# Patient Record
Sex: FEMALE | Race: WHITE | NOT HISPANIC OR LATINO | Employment: OTHER | URBAN - METROPOLITAN AREA
[De-identification: names, ages, dates, MRNs, and addresses within clinical notes are randomized per-mention and may not be internally consistent; named-entity substitution may affect disease eponyms.]

---

## 2017-08-21 ENCOUNTER — ALLSCRIPTS OFFICE VISIT (OUTPATIENT)
Dept: OTHER | Facility: OTHER | Age: 77
End: 2017-08-21

## 2018-01-13 VITALS
SYSTOLIC BLOOD PRESSURE: 140 MMHG | DIASTOLIC BLOOD PRESSURE: 72 MMHG | RESPIRATION RATE: 14 BRPM | HEART RATE: 88 BPM | HEIGHT: 60 IN | BODY MASS INDEX: 30.04 KG/M2 | TEMPERATURE: 97.8 F | WEIGHT: 153 LBS

## 2018-01-13 NOTE — PROGRESS NOTES
Assessment    1  Medicare annual wellness visit, subsequent (V70 0) (Z00 00)    Discussion/Summary  Impression: Subsequent Annual Wellness Visit, with preventive exam as well as age and risk appropriate counseling completed  Cardiovascular screening and counseling: the risks and benefits of screening were discussed, counseling was given on maintaining a healthy diet, counseling was given on maintaining a healthy weight, counseling was given on ways to improve cholesterol and counseling was given on ways to improve exercise tolerance  Diabetes screening and counseling: the risks and benefits of screening were discussed, counseling was given on maintaining a healthy diet, counseling was given on maintaining a healthy weight and counseling was given on ways to improve physical activity  Colorectal cancer screening and counseling: the risks and benefits of screening were discussed  Breast cancer screening and counseling: the risks and benefits of screening were discussed  Cervical cancer screening and counseling: screening not indicated  Osteoporosis screening and counseling: the risks and benefits of screening were discussed, counseling was given on obtaining adequate amounts of calcium and vitamin D on a daily basis and counseling was given on the importance of regular weightbearing exercise  Abdominal aortic aneurysm screening and counseling: screening not indicated  Glaucoma screening and counseling: screening is current  HIV screening and counseling: screening not indicated   Immunizations: influenza vaccine is up to date this year, the lifetime pneumococcal vaccine has been completed, hepatitis B vaccination series is not indicated at this time due to the patient's low risk of ean the disease, the risks and benefits of the Zostavax vaccine were discussed with the patient, the risks and benefits of the Td vaccine were discussed with the patient and the risks and benefits of the Tdap vaccine were discussed with the patient  Advance Directive Planning: paperwork and instructions were given to the patient, she was encouraged to follow-up with me to discuss her questions and/or decisions  Patient Discussion: plan discussed with the patient, follow-up as needed  Chief Complaint  pt here for AWV tc/cma      History of Present Illness  The patient is being seen for the subsequent annual wellness visit  Medicare Screening and Risk Factors   Hospitalizations: no previous hospitalizations  Once per lifetime medicare screening tests: ECG has not been done and AAA screening US has not yet been done  Medicare Screening Tests Risk Questions   Abdominal aortic aneurysm risk assessment: none indicated  Osteoporosis risk assessment: , female gender, over 48years of age, past medical history of fracture(s) and family history of fracture(s)  HIV risk assessment: none indicated  Drug and Alcohol Use: The patient is a former cigarette smoker and quit 40 yrs  The patient reports occasional alcohol use  Alcohol concern:   The patient has no concerns about alcohol abuse  She has never used illicit drugs  Diet and Physical Activity: Current diet includes well balanced meals, 1 servings of fruit per day, 1 servings of meat per day, 1 cups of coffee per day and 1 cups of tea per day  She exercises infrequently  Exercise: walking  Mood Disorder and Cognitive Impairment Screening: PHQ-9 Depression Scale   Over the past 2 weeks, how often have you been bothered by the following problems? 1 ) Little interest or pleasure in doing things? Not at all    2 ) Feeling down, depressed or hopeless? Not at all    3 ) Trouble falling asleep or sleeping too much? Not at all    4 ) Feeling tired or having little energy? Not at all    5 ) Poor appetite or overeating? Not at all    6 ) Feeling bad about yourself, or that you are a failure, or have let yourself or your family down?  Not at all    7 ) Trouble concentrating on things, such as reading a newspaper or watching television? Not at all    8 ) Moving or speaking so slowly that other people could have noticed, or the opposite, moving or speaking faster than usual? Not at all  TOTAL SCORE: 0    How difficult have these problems made it for you to do your work, take care of things at home, or get along with people? Not at all  She denies feeling down, depressed, or hopeless over the past two weeks  She denies feeling little interest or pleasure in doing things over the past two weeks  Cognitive impairment screening: denies difficulty learning/retaining new information, denies difficulty handling complex tasks, denies difficulty with reasoning, denies difficulty with spatial ability and orientation, denies difficulty with language and denies difficulty with behavior  Functional Ability/Level of Safety: Hearing is normal bilaterally, normal in the right ear, normal in the left ear and a hearing aid is not used  She denies hearing difficulties  The patient is currently able to do activities of daily living without limitations, able to do instrumental activities of daily living without limitations, able to participate in social activities without limitations and able to drive without limitations  Activities of daily living details: does not need help using the phone, no transportation help needed, does not need help shopping, no meal preparation help needed, does not need help doing housework, does not need help doing laundry, does not need help managing medications and does not need help managing money  Fall risk factors:   The patient fell 1 with injury times in the past 12 months , no polypharmacy, no alcohol use, no mobility impairment, no antidepressant use, no deconditioning, no postural hypotension, no sedative use, no visual impairment, no urinary incontinence, no antihypertensive use, no cognitive impairment, up and go test was normal and no previous fall  Home safety risk factors:  no grab bars in the bathroom, but no unfamiliar surroundings, no loose rugs, no poor household lighting, no uneven floors, no household clutter and handrails on the stairs  Advance Directives: Advance directives: Five Wishes Pamphlet given to review/complete, but no living will, no durable power of  for health care directives and no advance directives  end of life decisions were reviewed with the patient  Co-Managers and Medical Equipment/Suppliers: See Patient Care Team      Active Problems    1  Acute bronchitis (466 0) (J20 9)   2  Asthma (493 90) (J45 909)   3  Back spasm (724 8) (M62 830)   4  Cellulitis (682 9) (L03 90)   5  Cough (786 2) (R05)   6  Encounter for screening colonoscopy (V76 51) (Z12 11)   7  Encounter for screening mammogram for malignant neoplasm of breast (V76 12)   (Z12 31)   8  Generalized osteoarthritis (715 00) (M15 9)   9  Initial Medicare annual wellness visit (V70 0) (Z00 00)   10  Mild persistent asthma (493 90) (J45 30)   11  Need for influenza vaccination (V04 81) (Z23)   12  Need for pneumococcal vaccine (V03 82) (Z23)   13  Osteoarthritis of right knee (715 96) (M17 9)   14  Osteoporosis (733 00) (M81 0)    Past Medical History    1  History of upper respiratory infection (V12 09) (Z87 09)   2  History of upper respiratory infection (V12 09) (Z87 09)    The active problems and past medical history were reviewed and updated today  Surgical History    1  History of Back Surgery    The surgical history was reviewed and updated today  Family History  Father    1  Family history of Lung cancer    The family history was reviewed and updated today  Social History    · Being A Social Drinker   · Daily Coffee Consumption (2  Cups/Day)   · Former smoker (Z73 92) (G00 500)  The social history was reviewed and updated today  Current Meds    The medication list was reviewed and updated today  Allergies    1  CeleBREX CAPS   2  Sulfa Drugs    3  Nickel    Immunizations  Influenza --- Doug Chancy: [id number] [place]: 28-Oct-2015; Series3: 12-Sep-2016   PCV --- Series1: 28-Oct-2015   PPSV --- Series1: 23-Aug-2014     Vitals  Signs   Recorded: 07MTA4537 01:23PM   Temperature: 97 5 F, Temporal  Heart Rate: 78, R Radial  Pulse Quality: Normal, R Radial  Respiration Quality: Normal  Respiration: 16  Systolic: 315, RUE, Sitting  Diastolic: 80, RUE, Sitting  Height: 4 ft 11 5 in  Weight: 165 lb   BMI Calculated: 32 77  BSA Calculated: 1 71    Results/Data  Falls Risk Assessment (Dx Z13 89 Screen for Neurologic Disorder) [id number] 01:43PM User, Ahs     Test Name Result Flag Reference   Falls Risk      Falls with injury in the past year     Prime MD Depression Screening 2016 01:43PM User, Ahs     Test Name Result Flag Reference   PRIME-MD Depression Screening 0/9 - Likely not MD     Depressed mood: No  Loss of interest: No       Procedure    Procedure:  pt has [facility] exam in [facility]  Encounter for screening mammogram for malignant neoplasm of breast   * MAMMO SCREENING BILATERAL W CAD; every 1 year; Last [id number]; Next Due: 38YTE9374;   Active    Signatures   Electronically signed by : ANGIE Andrew ; Dec  1 2016  1:15PM EST                       (Author)

## 2018-01-17 NOTE — PROGRESS NOTES
Chief Complaint  patient presenting today for her flu vaccine sl/cma      Active Problems    1  Acute bronchitis (466 0) (J20 9)   2  Asthma (493 90) (J45 909)   3  Back spasm (724 8) (M62 830)   4  Cellulitis (682 9) (L03 90)   5  Cough (786 2) (R05)   6  Encounter for screening colonoscopy (V76 51) (Z12 11)   7  Encounter for screening mammogram for malignant neoplasm of breast (V76 12)   (Z12 31)   8  Generalized osteoarthritis (715 00) (M15 9)   9  Initial Medicare annual wellness visit (V70 0) (Z00 00)   10  Mild persistent asthma (493 90) (J45 30)   11  Need for influenza vaccination (V04 81) (Z23)   12  Need for pneumococcal vaccine (V03 82) (Z23)   13  Osteoarthritis of right knee (715 96) (M17 9)   14  Osteoporosis (733 00) (M81 0)    Current Meds   1  Alendronate Sodium 70 MG Oral Tablet; TAKE 1 TABLET ONCE WEEKLY; Therapy: 16TIT2522 to (Evaluate:17Jan2017)  Requested for: 25VLN8105; Last   Rx:[id number] Ordered   2  Ibuprofen 200 MG Oral Tablet; TAKE 1 TABLET EVERY 6 HOURS AS NEEDED; Therapy: 98VAW6266 to Recorded   3  Mucinex 600 MG Oral Tablet Extended Release 12 Hour; Take 1 tablet daily; Therapy: 75TOL3793 to (Evaluate:11Mar2015); Last Rx:10Mar2015 Ordered   4  ProAir  (90 Base) MCG/ACT Inhalation Aerosol Solution; INHALE 2 PUFFS   FOUR TIMES DAILY AS DIRECTED; Therapy: 04YLJ7633 to (Last Rx:28Oct2015)  Requested for: 28Oct2015 Ordered    Allergies    1  CeleBREX CAPS   2  Sulfa Drugs    Plan  Need for influenza vaccination    · Fluzone High-Dose 0 5 ML Intramuscular Suspension Prefilled Syringe    Future Appointments    Date/Time Provider Specialty Site   10/24/2016 09:00 AM ANGIE Mcpherson   Family Medicine [address]     Signatures   Electronically signed by : ANGIE Ware ; Sep 13 2016 12:52PM EST                       (Author)

## 2018-07-24 ENCOUNTER — TRANSITIONAL CARE MANAGEMENT (OUTPATIENT)
Dept: INTERNAL MEDICINE CLINIC | Facility: CLINIC | Age: 78
End: 2018-07-24

## 2018-07-24 ENCOUNTER — TELEPHONE (OUTPATIENT)
Dept: INTERNAL MEDICINE CLINIC | Facility: CLINIC | Age: 78
End: 2018-07-24

## 2018-07-24 NOTE — TELEPHONE ENCOUNTER
Barbara Barajas called and states she received a call from you    She is concerned that she received the call and she is not a patient here and has not been in the hospital     Please review the list- this patient does not need any care or any follow up    I advised her we will remove her from the list    Thank you